# Patient Record
Sex: FEMALE | Race: WHITE | NOT HISPANIC OR LATINO | URBAN - METROPOLITAN AREA
[De-identification: names, ages, dates, MRNs, and addresses within clinical notes are randomized per-mention and may not be internally consistent; named-entity substitution may affect disease eponyms.]

---

## 2017-04-20 ENCOUNTER — HOSPITAL ENCOUNTER (OUTPATIENT)
Dept: MEDSURG UNIT | Facility: CLINIC | Age: 32
Discharge: HOME | End: 2017-04-20
Attending: OBSTETRICS & GYNECOLOGY | Admitting: OBSTETRICS & GYNECOLOGY

## 2017-04-28 ENCOUNTER — ANESTHESIA - HEALTHEAST (OUTPATIENT)
Dept: OBGYN | Facility: CLINIC | Age: 32
End: 2017-04-28

## 2017-05-01 ENCOUNTER — COMMUNICATION - HEALTHEAST (OUTPATIENT)
Dept: OBGYN | Facility: CLINIC | Age: 32
End: 2017-05-01

## 2017-05-01 ENCOUNTER — HOME CARE/HOSPICE - HEALTHEAST (OUTPATIENT)
Dept: HOME HEALTH SERVICES | Facility: HOME HEALTH | Age: 32
End: 2017-05-01

## 2018-08-27 ENCOUNTER — ANESTHESIA - HEALTHEAST (OUTPATIENT)
Dept: SURGERY | Facility: AMBULATORY SURGERY CENTER | Age: 33
End: 2018-08-27

## 2018-08-27 ASSESSMENT — MIFFLIN-ST. JEOR: SCORE: 1135.41

## 2018-08-28 ENCOUNTER — SURGERY - HEALTHEAST (OUTPATIENT)
Dept: SURGERY | Facility: AMBULATORY SURGERY CENTER | Age: 33
End: 2018-08-28

## 2018-08-28 ASSESSMENT — MIFFLIN-ST. JEOR: SCORE: 1135.41

## 2021-06-01 VITALS — HEIGHT: 63 IN | WEIGHT: 105 LBS | BODY MASS INDEX: 18.61 KG/M2

## 2021-06-10 NOTE — ANESTHESIA POSTPROCEDURE EVALUATION
Patient: Blanka Enriquez  * No procedures listed *  Anesthesia type: regional    Patient location: Labor and Delivery  Last vitals:   Vitals:    04/28/17 2030   BP: 111/59   Pulse: 72   Resp:    Temp:    SpO2:      Post vital signs: stable  Level of consciousness: awake and responds to simple questions  Post-anesthesia pain: pain controlled  Post-anesthesia nausea and vomiting: no  Pulmonary: unassisted, return to baseline  Cardiovascular: stable and blood pressure at baseline  Hydration: adequate  Anesthetic events: no    QCDR Measures:  ASA# 11 - Christina-op Cardiac Arrest: ASA11B - Patient did NOT experience unanticipated cardiac arrest  ASA# 12 - Christina-op Mortality Rate: ASA12B - Patient did NOT die  ASA# 13 - PACU Re-Intubation Rate: NA - No ETT / LMA used for case  ASA# 10 - Composite Anes Safety: ASA10A - No serious adverse event  ASA# 38 - New Corneal Injury: ASA38A - No new exposure keratitis or corneal abrasion in PACU    Additional Notes:  Recovering well from labor epidural.  No apparent complications.

## 2021-06-10 NOTE — ANESTHESIA PREPROCEDURE EVALUATION
Anesthesia Evaluation      Patient summary reviewed   No history of anesthetic complications     Airway   Mallampati: II   Pulmonary - negative ROS                          Cardiovascular - negative ROS   Neuro/Psych - negative ROS     Endo/Other    (+) pregnant     GI/Hepatic/Renal - negative ROS      Other findings: Induction for fetal tachycardia      Dental                         Anesthesia Plan  Planned anesthetic: epidural  Labor epidural  ASA 2     Anesthetic plan and risks discussed with: patient and spouse    Post-op plan: routine recovery        Patient requests labor analgesia.  Patient identified.  Medical history reviewed; lab results and allergies reviewed.  Patient interviewed and examined.  Risks(including headache, back pain, low blood pressure, high block and related complications, failed block and/or inadequate analgesia, infection, bleeding and nerve injury), alternatives and procedure discussed and questions answered. Patient gives consent for epidural.  Correct patient, procedure/site verified.Hands washed, sterile gloves and mask worn.    Maribell Smith M.D.

## 2021-06-10 NOTE — DISCHARGE SUMMARY
Informed pt her ROM+ was neg  Pt states feeling contractions but not painful ... Would like to be checked to see if any progress last week,  SVE cervix same as in clinic ... Went over dc papers and answered questions.

## 2021-06-10 NOTE — ANESTHESIA PROCEDURE NOTES
Epidural Block    Patient location during procedure: OB  Time Called: 4/28/2017 3:20 PM  Reason for Block:labor epidural  Staffing:  Performing  Anesthesiologist: AYAN MACE  Preanesthetic Checklist  Completed: patient identified, risks, benefits, and alternatives discussed, timeout performed, consent obtained, at patient's request, airway assessed, oxygen available, suction available, emergency drugs available and hand hygiene performed  Procedure  Patient position: sitting  Prep: ChloraPrep  Patient monitoring: continuous pulse oximetry, blood pressure and heart rate  Approach: midline  Location: L2-L3  Injection technique: JC saline  Number of Attempts:2  Needle  Needle type: [employer]   Needle gauge: 18 G     Catheter in Space: 4  Assessment  Sensory level:      Events: blood aspirated and positive IV test     Additional Notes:  Epidural catheter placed at L3-4 on first attempt.   Blood was aspirated at 5 cm, so catheter was flushed with saline and pulled back to 3cm in epidural space.  Aspiration was negative.   Test dose was given and patient immediately felt light headed with an increase in HR from 70s to 99, indicating catheter was likely intravascular.  Epidural was then placed easily on the 1st attempt at L2-3, where aspiration was negative and test dose was negative.

## 2021-06-20 NOTE — ANESTHESIA CARE TRANSFER NOTE
Last vitals:   Vitals:    08/28/18 0916   BP: 112/76   Pulse: 67   Resp: 16   Temp: 36.9  C (98.4  F)   SpO2: 100%     Patient's level of consciousness is awake, alert, oriented. Denies pain. Denies nausea.   Spontaneous respirations: yes  Maintains airway independently: yes  Dentition unchanged: yes  Oropharynx: oropharynx clear of all foreign objects    QCDR Measures:  ASA# 20 - Surgical Safety Checklist: WHO surgical safety checklist completed prior to induction  PQRS# 430 - Adult PONV Prevention: 4558F - Pt received => 2 anti-emetic agents (different classes) preop & intraop  ASA# 8 - Peds PONV Prevention: NA - Not pediatric patient, not GA or 2 or more risk factors NOT present  PQRS# 424 - Christina-op Temp Management: 4559F - At least one body temp DOCUMENTED => 35.5C or 95.9F within required timeframe  PQRS# 426 - PACU Transfer Protocol: - Transfer of care checklist used  ASA# 14 - Acute Post-op Pain: ASA14B - Patient did NOT experience pain >= 7 out of 10

## 2021-06-20 NOTE — ANESTHESIA POSTPROCEDURE EVALUATION
Patient: Blanka Enriquez  HYSTEROSCOPY,SUCTION DILATION AND CURETTAGE  Anesthesia type: MAC    Patient location: Phase II Recovery  Last vitals:   Vitals:    08/28/18 1154   BP: 114/70   Pulse: 80   Resp: 16   Temp: 36.7  C (98  F)   SpO2: 98%     Post vital signs: stable  Level of consciousness: awake and responds to simple questions  Post-anesthesia pain: pain controlled  Post-anesthesia nausea and vomiting: no  Pulmonary: unassisted, return to baseline  Cardiovascular: stable and blood pressure at baseline  Hydration: adequate  Anesthetic events: no    QCDR Measures:  ASA# 11 - Christina-op Cardiac Arrest: ASA11B - Patient did NOT experience unanticipated cardiac arrest  ASA# 12 - Christina-op Mortality Rate: ASA12B - Patient did NOT die  ASA# 13 - PACU Re-Intubation Rate: ASA13B - Patient did NOT require a new airway mgmt  ASA# 10 - Composite Anes Safety: ASA10A - No serious adverse event    Additional Notes:

## 2021-07-03 NOTE — ANESTHESIA PREPROCEDURE EVALUATION
Anesthesia Preprocedure Evaluation by Isaias Navarrete MD at 8/27/2018 12:02 PM     Author: Isaias Navarrete MD Service: -- Author Type: Physician    Filed: 8/28/2018  9:53 AM Date of Service: 8/27/2018 12:02 PM Status: Addendum    : Isaias Navarrete MD (Physician)    Related Notes: Original Note by Paul Meek MD (Physician) filed at 8/27/2018 12:05 PM       Anesthesia Evaluation      Patient summary reviewed   No history of anesthetic complications     Airway   Mallampati: II  Neck ROM: full   Pulmonary - negative ROS and normal exam    breath sounds clear to auscultation                         Cardiovascular - negative ROS and normal exam   Neuro/Psych    (+) depression, anxiety/panic attacks,     Endo/Other       Comments: Dysmenorrhea    GI/Hepatic/Renal - negative ROS      Other findings:       Anemia - Hgb 10.6    K 4.1, Cr 0.74, Plts 215K      PREGNANCY: negative      Dental                             Anesthesia Plan  Planned anesthetic: MAC  Propofol IV sedation  Ketamine IV PRN  Zofran/Decadron  GA PRN  ASA 2     Anesthetic plan and risks discussed with: patient and spouse  Anesthesia plan special considerations: antiemetics,   Post-op plan: routine recovery      Results for orders placed or performed during the hospital encounter of 08/28/18   POCT Pregnancy (Beta-hCG, Qual), Urine (Point of Care) on DOS   Result Value Ref Range    POC Preg, Urine Negative Negative    POCt Kit Lot Number [id number]     POCT Kit Expiration Date 1/2020    POCT pregnancy, urine ([facility])   Result Value Ref Range    POC Preg, Urine Negative Negative    POCt Kit Lot Number [id number]     POCT Kit Expiration Date 1/20

## 2021-07-14 PROBLEM — Z34.90 PREGNANT: Status: RESOLVED | Noted: 2017-04-27 | Resolved: 2018-08-28
